# Patient Record
Sex: FEMALE | Race: WHITE | Employment: OTHER | URBAN - METROPOLITAN AREA
[De-identification: names, ages, dates, MRNs, and addresses within clinical notes are randomized per-mention and may not be internally consistent; named-entity substitution may affect disease eponyms.]

---

## 2022-08-16 NOTE — PATIENT DISCUSSION
Discussed with pt that if worsening of cataracts by next visit will discuses about referring out for removal.

## 2022-08-16 NOTE — PATIENT DISCUSSION
We will do an over refraction next complete for CL unless pt vison starts worsening then we can see pt sooner.

## 2023-01-03 ENCOUNTER — NEW PATIENT (OUTPATIENT)
Dept: URBAN - METROPOLITAN AREA CLINIC 37 | Facility: CLINIC | Age: 68
End: 2023-01-03

## 2023-01-03 DIAGNOSIS — H52.203: ICD-10-CM

## 2023-01-03 DIAGNOSIS — H52.03: ICD-10-CM

## 2023-01-03 DIAGNOSIS — H25.13: ICD-10-CM

## 2023-01-03 DIAGNOSIS — H35.30: ICD-10-CM

## 2023-01-03 PROCEDURE — 92250 FUNDUS PHOTOGRAPHY W/I&R: CPT

## 2023-01-03 PROCEDURE — 92004 COMPRE OPH EXAM NEW PT 1/>: CPT

## 2023-01-03 PROCEDURE — 92015 DETERMINE REFRACTIVE STATE: CPT

## 2023-01-03 ASSESSMENT — VISUAL ACUITY
OS_CC: 20/20
OU_CC: 20/20
OD_CC: 20/20

## 2023-01-03 ASSESSMENT — TONOMETRY
OS_IOP_MMHG: 17
OD_IOP_MMHG: 17

## 2024-01-04 ENCOUNTER — COMPREHENSIVE EXAM (OUTPATIENT)
Dept: URBAN - METROPOLITAN AREA CLINIC 38 | Facility: CLINIC | Age: 69
End: 2024-01-04

## 2024-01-04 DIAGNOSIS — H52.03: ICD-10-CM

## 2024-01-04 DIAGNOSIS — H25.813: ICD-10-CM

## 2024-01-04 DIAGNOSIS — Z97.3: ICD-10-CM

## 2024-01-04 DIAGNOSIS — H04.123: ICD-10-CM

## 2024-01-04 DIAGNOSIS — H52.4: ICD-10-CM

## 2024-01-04 DIAGNOSIS — H52.203: ICD-10-CM

## 2024-01-04 PROCEDURE — 92014 COMPRE OPH EXAM EST PT 1/>: CPT

## 2024-01-04 PROCEDURE — 92015 DETERMINE REFRACTIVE STATE: CPT

## 2024-01-04 ASSESSMENT — VISUAL ACUITY
OS_SC: 20/20
OS_CC: J2 BLURRY
OD_SC: 20/20 BLURRY
OU_CC: J1
OD_CC: J1 BLURRY
OU_SC: 20/20-1

## 2024-01-04 ASSESSMENT — KERATOMETRY
OD_AXISANGLE_DEGREES: 10
OS_K2POWER_DIOPTERS: 44.00
OS_AXISANGLE_DEGREES: 10
OD_K1POWER_DIOPTERS: 44.00
OS_K1POWER_DIOPTERS: 44.50
OD_AXISANGLE2_DEGREES: 100
OD_K2POWER_DIOPTERS: 43.00
OS_AXISANGLE2_DEGREES: 100

## 2024-01-04 ASSESSMENT — TONOMETRY
OD_IOP_MMHG: 17
OS_IOP_MMHG: 17

## 2024-01-12 ENCOUNTER — ESTABLISHED PATIENT (OUTPATIENT)
Dept: URBAN - METROPOLITAN AREA CLINIC 38 | Facility: CLINIC | Age: 69
End: 2024-01-12

## 2024-01-12 DIAGNOSIS — H25.813: ICD-10-CM

## 2024-01-12 DIAGNOSIS — H04.123: ICD-10-CM

## 2024-01-12 DIAGNOSIS — Z97.3: ICD-10-CM

## 2024-01-12 DIAGNOSIS — H52.7: ICD-10-CM

## 2024-01-12 PROCEDURE — 92015GRNC REFRACTION GLASSES RECHECK - NO CHARGE

## 2024-01-12 ASSESSMENT — KERATOMETRY
OD_AXISANGLE_DEGREES: 10
OD_AXISANGLE2_DEGREES: 100
OS_AXISANGLE2_DEGREES: 100
OS_AXISANGLE_DEGREES: 10
OD_K2POWER_DIOPTERS: 43.00
OS_K1POWER_DIOPTERS: 44.50
OD_K1POWER_DIOPTERS: 44.00
OS_K2POWER_DIOPTERS: 44.00

## 2024-01-23 ENCOUNTER — CONSULTATION/EVALUATION (OUTPATIENT)
Dept: URBAN - METROPOLITAN AREA CLINIC 39 | Facility: CLINIC | Age: 69
End: 2024-01-23

## 2024-01-23 DIAGNOSIS — H52.7: ICD-10-CM

## 2024-01-23 PROCEDURE — 92136 OPHTHALMIC BIOMETRY: CPT

## 2024-01-23 PROCEDURE — 92025 CPTRIZED CORNEAL TOPOGRAPHY: CPT

## 2024-01-23 PROCEDURE — 99499RLE REFRACTIVE CONSULT/RLE

## 2024-01-23 ASSESSMENT — KERATOMETRY
OS_K1POWER_DIOPTERS: 44.50
OD_AXISANGLE2_DEGREES: 100
OS_AXISANGLE2_DEGREES: 100
OD_K2POWER_DIOPTERS: 43.00
OD_K1POWER_DIOPTERS: 44.00
OS_K2POWER_DIOPTERS: 44.00
OS_AXISANGLE_DEGREES: 10
OD_AXISANGLE_DEGREES: 10

## 2024-01-23 ASSESSMENT — VISUAL ACUITY
OD_SC: J12
OS_SC: J10
OD_CC: 20/20
OD_SC: 20/20
OS_CC: J1
OS_SC: 20/20-1
OD_CC: J1
OS_CC: 20/20

## 2024-01-23 ASSESSMENT — TONOMETRY
OS_IOP_MMHG: 21
OD_IOP_MMHG: 23

## 2025-01-03 ENCOUNTER — COMPREHENSIVE EXAM (OUTPATIENT)
Age: 70
End: 2025-01-03

## 2025-01-03 DIAGNOSIS — H25.813: ICD-10-CM

## 2025-01-03 DIAGNOSIS — H52.7: ICD-10-CM

## 2025-01-03 DIAGNOSIS — H04.123: ICD-10-CM

## 2025-01-03 PROCEDURE — 92014 COMPRE OPH EXAM EST PT 1/>: CPT

## 2025-01-03 PROCEDURE — 92015 DETERMINE REFRACTIVE STATE: CPT
